# Patient Record
Sex: MALE | Race: WHITE | NOT HISPANIC OR LATINO | Employment: UNEMPLOYED | URBAN - METROPOLITAN AREA
[De-identification: names, ages, dates, MRNs, and addresses within clinical notes are randomized per-mention and may not be internally consistent; named-entity substitution may affect disease eponyms.]

---

## 2020-03-05 ENCOUNTER — HOSPITAL ENCOUNTER (OUTPATIENT)
Dept: OTHER | Facility: HOSPITAL | Age: 60
Discharge: HOME | End: 2020-03-05
Attending: FAMILY MEDICINE

## 2020-03-05 LAB
CREAT BLD-MCNC: 1.1 MG/DL (ref 0.6–1.4)
GFR SERPLBLD BASED ON 1.73 SQ M-ARVRAT: >60 ML/MIN/{1.73_M2}

## 2020-05-11 ENCOUNTER — TELEHEALTH PROVIDED OTHER THAN IN PATIENT'S HOME (OUTPATIENT)
Dept: URBAN - METROPOLITAN AREA TELEHEALTH 6 | Facility: TELEHEALTH | Age: 60
End: 2020-05-11
Payer: COMMERCIAL

## 2020-05-11 VITALS — WEIGHT: 140 LBS | HEIGHT: 65 IN

## 2020-05-11 DIAGNOSIS — K57.30 DIVERTICULOSIS OF LARGE INTESTINE WITHOUT PERFORATION OR ABS: ICD-10-CM

## 2020-05-11 DIAGNOSIS — Z12.11 ENCOUNTER FOR SCREENING FOR MALIGNANT NEOPLASM OF COLON: ICD-10-CM

## 2020-05-11 PROCEDURE — 99202 OFFICE O/P NEW SF 15 MIN: CPT | Mod: 95 | Performed by: INTERNAL MEDICINE

## 2020-10-21 VITALS
RESPIRATION RATE: 6 BRPM | HEART RATE: 78 BPM | SYSTOLIC BLOOD PRESSURE: 141 MMHG | DIASTOLIC BLOOD PRESSURE: 72 MMHG | SYSTOLIC BLOOD PRESSURE: 102 MMHG | HEART RATE: 85 BPM | HEART RATE: 64 BPM | OXYGEN SATURATION: 95 % | OXYGEN SATURATION: 99 % | SYSTOLIC BLOOD PRESSURE: 131 MMHG | HEIGHT: 65 IN | RESPIRATION RATE: 20 BRPM | SYSTOLIC BLOOD PRESSURE: 143 MMHG | RESPIRATION RATE: 12 BRPM | DIASTOLIC BLOOD PRESSURE: 80 MMHG | SYSTOLIC BLOOD PRESSURE: 111 MMHG | OXYGEN SATURATION: 92 % | SYSTOLIC BLOOD PRESSURE: 125 MMHG | DIASTOLIC BLOOD PRESSURE: 85 MMHG | DIASTOLIC BLOOD PRESSURE: 73 MMHG | HEART RATE: 77 BPM | TEMPERATURE: 97.9 F | DIASTOLIC BLOOD PRESSURE: 103 MMHG | HEART RATE: 75 BPM | RESPIRATION RATE: 13 BRPM | WEIGHT: 140 LBS | DIASTOLIC BLOOD PRESSURE: 82 MMHG | RESPIRATION RATE: 16 BRPM | OXYGEN SATURATION: 98 % | HEART RATE: 87 BPM | SYSTOLIC BLOOD PRESSURE: 109 MMHG | HEART RATE: 68 BPM | HEART RATE: 81 BPM | DIASTOLIC BLOOD PRESSURE: 89 MMHG | DIASTOLIC BLOOD PRESSURE: 74 MMHG | TEMPERATURE: 97 F | RESPIRATION RATE: 14 BRPM | DIASTOLIC BLOOD PRESSURE: 75 MMHG | SYSTOLIC BLOOD PRESSURE: 118 MMHG | SYSTOLIC BLOOD PRESSURE: 119 MMHG | SYSTOLIC BLOOD PRESSURE: 122 MMHG | HEART RATE: 71 BPM

## 2020-10-26 ENCOUNTER — OFFICE (OUTPATIENT)
Dept: URBAN - METROPOLITAN AREA PATHOLOGY 4 | Facility: PATHOLOGY | Age: 60
End: 2020-10-26
Payer: COMMERCIAL

## 2020-10-26 ENCOUNTER — AMBULATORY SURGICAL CENTER (OUTPATIENT)
Dept: URBAN - METROPOLITAN AREA SURGERY 17 | Facility: SURGERY | Age: 60
End: 2020-10-26
Payer: COMMERCIAL

## 2020-10-26 DIAGNOSIS — Z12.11 ENCOUNTER FOR SCREENING FOR MALIGNANT NEOPLASM OF COLON: ICD-10-CM

## 2020-10-26 DIAGNOSIS — K63.5 POLYP OF COLON: ICD-10-CM

## 2020-10-26 DIAGNOSIS — D12.8 BENIGN NEOPLASM OF RECTUM: ICD-10-CM

## 2020-10-26 DIAGNOSIS — D12.2 BENIGN NEOPLASM OF ASCENDING COLON: ICD-10-CM

## 2020-10-26 DIAGNOSIS — K57.30 DIVERTICULOSIS OF LARGE INTESTINE WITHOUT PERFORATION OR ABS: ICD-10-CM

## 2020-10-26 PROBLEM — D12.3 BENIGN NEOPLASM OF TRANSVERSE COLON: Status: ACTIVE | Noted: 2020-10-26

## 2020-10-26 PROBLEM — K62.1 RECTAL POLYP: Status: ACTIVE | Noted: 2020-10-26

## 2020-10-26 LAB
GI HISTOLOGY: A. UNSPECIFIED: (no result)
GI HISTOLOGY: B. UNSPECIFIED: (no result)
GI HISTOLOGY: C. UNSPECIFIED: (no result)
GI HISTOLOGY: PDF REPORT: (no result)

## 2020-10-26 PROCEDURE — 88305 TISSUE EXAM BY PATHOLOGIST: CPT | Mod: 33 | Performed by: INTERNAL MEDICINE

## 2020-10-26 PROCEDURE — 45380 COLONOSCOPY AND BIOPSY: CPT | Mod: 33,59 | Performed by: INTERNAL MEDICINE

## 2020-10-26 PROCEDURE — 45385 COLONOSCOPY W/LESION REMOVAL: CPT | Mod: 33 | Performed by: INTERNAL MEDICINE

## 2022-06-28 ENCOUNTER — TRANSCRIBE ORDERS (OUTPATIENT)
Dept: PULMONOLOGY | Facility: HOSPITAL | Age: 62
End: 2022-06-28

## 2022-06-28 DIAGNOSIS — R05.3 CHRONIC COUGH: Primary | ICD-10-CM

## 2022-08-04 ENCOUNTER — HOSPITAL ENCOUNTER (OUTPATIENT)
Dept: CT IMAGING | Facility: HOSPITAL | Age: 62
Discharge: HOME | End: 2022-08-04
Admitting: INTERNAL MEDICINE

## 2022-08-04 DIAGNOSIS — R05.3 CHRONIC COUGH: ICD-10-CM

## 2022-08-04 PROCEDURE — 71250 CT THORAX DX C-: CPT

## 2025-01-02 ENCOUNTER — OFFICE VISIT (OUTPATIENT)
Dept: CARDIOLOGY | Facility: CLINIC | Age: 65
End: 2025-01-02
Payer: COMMERCIAL

## 2025-01-02 VITALS
HEIGHT: 65 IN | HEART RATE: 83 BPM | WEIGHT: 144 LBS | OXYGEN SATURATION: 97 % | BODY MASS INDEX: 23.99 KG/M2 | SYSTOLIC BLOOD PRESSURE: 154 MMHG | DIASTOLIC BLOOD PRESSURE: 90 MMHG

## 2025-01-02 DIAGNOSIS — I10 PRIMARY HYPERTENSION: ICD-10-CM

## 2025-01-02 DIAGNOSIS — Z91.89 CHRONIC CHEST PAIN WITH HIGH RISK FOR CAD: Primary | ICD-10-CM

## 2025-01-02 DIAGNOSIS — G89.29 CHRONIC CHEST PAIN WITH HIGH RISK FOR CAD: Primary | ICD-10-CM

## 2025-01-02 DIAGNOSIS — R20.9 SENSATION OF COLD IN LEG: ICD-10-CM

## 2025-01-02 DIAGNOSIS — R07.9 CHRONIC CHEST PAIN WITH HIGH RISK FOR CAD: Primary | ICD-10-CM

## 2025-01-02 PROCEDURE — 93000 ELECTROCARDIOGRAM COMPLETE: CPT | Performed by: INTERNAL MEDICINE

## 2025-01-02 PROCEDURE — 99204 OFFICE O/P NEW MOD 45 MIN: CPT | Performed by: INTERNAL MEDICINE

## 2025-01-02 RX ORDER — TRIAMTERENE AND HYDROCHLOROTHIAZIDE 37.5; 25 MG/1; MG/1
1 CAPSULE ORAL DAILY
COMMUNITY
Start: 2024-12-31

## 2025-01-02 RX ORDER — METOPROLOL TARTRATE 50 MG
25 TABLET ORAL
COMMUNITY
End: 2025-01-02

## 2025-01-02 RX ORDER — NITROGLYCERIN 0.4 MG/1
TABLET SUBLINGUAL
COMMUNITY
Start: 2024-11-19

## 2025-01-02 RX ORDER — METOPROLOL SUCCINATE 25 MG/1
25 TABLET, EXTENDED RELEASE ORAL DAILY
Qty: 90 TABLET | Refills: 3 | Status: SHIPPED | OUTPATIENT
Start: 2025-01-02

## 2025-01-02 NOTE — PROGRESS NOTES
"      CARDIOLOGY    Mary Antony MD    ENCOUNTER DATE:  01/02/2025    Navi York / 64 y.o. / male        CHIEF COMPLAINT / REASON FOR OFFICE VISIT     Chest Pain      HISTORY OF PRESENT ILLNESS       HPI    Navi York is a 64 y.o. male     His blood pressure has been elevated. He had some chest pain at [facility]. His legs have felt cold. He has not had any cardiac workup.         VITAL SIGNS     Visit Vitals  /90   Pulse 83   Ht 165.1 cm (65\")   Wt 65.3 kg (144 lb)   SpO2 97%   BMI 23.96 kg/m²         Wt Readings from Last 3 Encounters:   01/02/25 65.3 kg (144 lb)     Body mass index is 23.96 kg/m².      PHYSICAL EXAMINATION     Constitutional:       General: Not in acute distress.  Neck:      Vascular: No carotid bruit or JVD.   Pulmonary:      Effort: Pulmonary effort is normal.      Breath sounds: Normal breath sounds.   Cardiovascular:      Normal rate. Regular rhythm.      Murmurs: There is no murmur.   Psychiatric:         Mood and Affect: Mood and affect normal.           REVIEWED DATA       ECG 12 Lead    Date/Time: 1/2/2025 10:06 AM  Performed by: Mary Antony MD    Authorized by: Mary Antony MD  Comparison: compared with previous ECG from 11/19/2024  Similar to previous ECG  Rhythm: sinus rhythm  BPM: 83  Conduction: conduction normal  Other findings: non-specific ST-T wave changes    Clinical impression: abnormal EKG                No results found for: \"GLUCOSE\", \"BUN\", \"CREATININE\", \"NA\", \"K\", \"CL\", \"CALCIUM\", \"PROTEINTOT\", \"ALBUMIN\", \"ALT\", \"AST\", \"ALKPHOS\", \"BILITOT\", \"GLOB\", \"AGRATIO\", \"BCR\", \"ANIONGAP\", \"EGFR\"    ASSESSMENT & PLAN      Diagnosis Plan   1. Chronic chest pain with high risk for CAD  Doppler Arterial Multi Level Lower Extremity - Bilateral CAR    Adult Stress Echo W/ Cont or Stress Agent if Necessary Per Protocol      2. Sensation of cold in leg  Doppler Arterial Multi Level Lower Extremity - Bilateral CAR    Adult Stress Echo W/ Cont or Stress Agent if Necessary " Per Protocol      3. Primary hypertension  Doppler Arterial Multi Level Lower Extremity - Bilateral CAR    Adult Stress Echo W/ Cont or Stress Agent if Necessary Per Protocol          Chest discomfort. I recommend that he have a stress echocardiogram. He is acceptable. Will do his stress echo here in the office and one of my nurses or I will call him once I have reviewed the results.   Cold sensation in his legs. Let's check an CHINO to make sure he is not having any sort of vascular disease.   Hypertension. His blood pressure is elevated today. He has a blood pressure cuff at home. I recommend that he continue with his medications and that he start checking his blood pressure on a routine basis. He is on metoprolol tartrate but he is only taking it once a day, so I switched it over to metoprolol succinate which is an appropriate once a day version of metoprolol, and if his blood pressure is not controlled we can start adjusting his medication. I am going to have him follow up with Didi in a couple of months to go over his blood pressure medications. In the meantime my office or I will be in touch with the results of his stress test and his CHINO.         Orders Placed This Encounter   Procedures    ECG 12 Lead     This order was created via procedure documentation     Order Specific Question:   Release to patient     Answer:   Routine Release [[id number]]    Adult Stress Echo W/ Cont or Stress Agent if Necessary Per Protocol     Standing Status:   Future     Standing Expiration Date:   1/2/2026     Order Specific Question:   What stress agent will be used?     Answer:   Exercise with Possible [employer]     Order Specific Question:   Reason for exam?     Answer:   Chest Pain     Order Specific Question:   Reason for exam?     Answer:   Dyspnea     Order Specific Question:   Release to patient     Answer:   Routine Release [[id number]]           MEDICATIONS         Discharge Medications            Accurate as of  January 2, 2025 10:07 AM. If you have any questions, ask your nurse or doctor.                New Medications        Instructions Start Date   metoprolol succinate XL 25 MG 24 hr tablet  Commonly known as: TOPROL-XL  Started by: Mary Antony   25 mg, Oral, Daily             Continue These Medications        Instructions Start Date   nitroglycerin 0.4 MG SL tablet  Commonly known as: NITROSTAT   DISSOLVE 1 TABLET UNDER THE TONGUE EVERY 5 MINUTES AS NEEDED FOR CHEST PAIN. DO NOT EXCEED A TOTAL OF 3 DOSES IN 15 MINUTES. CALL 911 IF NO RELIEF.      triamterene-hydrochlorothiazide 37.5-25 MG per capsule  Commonly known as: DYAZIDE   1 capsule, Daily             Stop These Medications      metoprolol tartrate 50 MG tablet  Commonly known as: LOPRESSOR  Stopped by: Mary Antony MD  01/02/25  10:07 EST    Part of this note may be an electronic transcription/translation of spoken language to printed text using the Dragon dictation system.

## 2025-01-03 ENCOUNTER — PATIENT ROUNDING (BHMG ONLY) (OUTPATIENT)
Dept: CARDIOLOGY | Facility: CLINIC | Age: 65
End: 2025-01-03
Payer: COMMERCIAL

## 2025-01-03 NOTE — PROGRESS NOTES
A My Chart message has been sent to the patient for [place] [facility]

## 2025-02-11 ENCOUNTER — TELEPHONE (OUTPATIENT)
Dept: CARDIOLOGY | Facility: CLINIC | Age: 65
End: 2025-02-11
Payer: COMMERCIAL

## 2025-02-12 ENCOUNTER — HOSPITAL ENCOUNTER (OUTPATIENT)
Dept: CARDIOLOGY | Facility: HOSPITAL | Age: 65
Discharge: HOME | End: 2025-02-12
Payer: COMMERCIAL

## 2025-02-12 ENCOUNTER — TELEPHONE (OUTPATIENT)
Dept: CARDIOLOGY | Facility: CLINIC | Age: 65
End: 2025-02-12
Payer: COMMERCIAL

## 2025-02-12 ENCOUNTER — TELEPHONE (OUTPATIENT)
Dept: CARDIOLOGY | Facility: CLINIC | Age: 65
End: 2025-02-12

## 2025-02-12 VITALS
WEIGHT: 144 LBS | BODY MASS INDEX: 23.99 KG/M2 | SYSTOLIC BLOOD PRESSURE: 122 MMHG | HEART RATE: 63 BPM | HEIGHT: 65 IN | DIASTOLIC BLOOD PRESSURE: 78 MMHG

## 2025-02-12 DIAGNOSIS — Z91.89 CHRONIC CHEST PAIN WITH HIGH RISK FOR CAD: ICD-10-CM

## 2025-02-12 DIAGNOSIS — R20.9 SENSATION OF COLD IN LEG: ICD-10-CM

## 2025-02-12 DIAGNOSIS — I10 PRIMARY HYPERTENSION: ICD-10-CM

## 2025-02-12 DIAGNOSIS — G89.29 CHRONIC CHEST PAIN WITH HIGH RISK FOR CAD: ICD-10-CM

## 2025-02-12 DIAGNOSIS — R07.9 CHRONIC CHEST PAIN WITH HIGH RISK FOR CAD: ICD-10-CM

## 2025-02-12 LAB
AORTIC ARCH: 2.8 CM
AORTIC DIMENSIONLESS INDEX: 0.96 (DI)
ASCENDING AORTA: 3.2 CM
AV MEAN PRESS GRAD SYS DOP V1V2: 4.3 MMHG
AV VMAX SYS DOP: 142.5 CM/SEC
BH CV ECHO MEAS - ACS: 1.92 CM
BH CV ECHO MEAS - AI P1/2T: 915.2 MSEC
BH CV ECHO MEAS - AO MAX PG: 8.1 MMHG
BH CV ECHO MEAS - AO ROOT DIAM: 3.1 CM
BH CV ECHO MEAS - AO V2 VTI: 33.7 CM
BH CV ECHO MEAS - AVA(I,D): 3.1 CM2
BH CV ECHO MEAS - EDV(CUBED): 79.5 ML
BH CV ECHO MEAS - EDV(MOD-SP2): 102 ML
BH CV ECHO MEAS - EDV(MOD-SP4): 121 ML
BH CV ECHO MEAS - EF(MOD-SP2): 69.6 %
BH CV ECHO MEAS - EF(MOD-SP4): 67.8 %
BH CV ECHO MEAS - ESV(CUBED): 16.1 ML
BH CV ECHO MEAS - ESV(MOD-SP2): 31 ML
BH CV ECHO MEAS - ESV(MOD-SP4): 39 ML
BH CV ECHO MEAS - FS: 41.3 %
BH CV ECHO MEAS - IVS/LVPW: 1.44 CM
BH CV ECHO MEAS - IVSD: 1.3 CM
BH CV ECHO MEAS - LAT PEAK E' VEL: 11.6 CM/SEC
BH CV ECHO MEAS - LV DIASTOLIC VOL/BSA (35-75): 71.2 CM2
BH CV ECHO MEAS - LV MASS(C)D: 162.9 GRAMS
BH CV ECHO MEAS - LV MAX PG: 7 MMHG
BH CV ECHO MEAS - LV MEAN PG: 3.9 MMHG
BH CV ECHO MEAS - LV SYSTOLIC VOL/BSA (12-30): 22.9 CM2
BH CV ECHO MEAS - LV V1 MAX: 132 CM/SEC
BH CV ECHO MEAS - LV V1 VTI: 32.7 CM
BH CV ECHO MEAS - LVIDD: 4.3 CM
BH CV ECHO MEAS - LVIDS: 2.5 CM
BH CV ECHO MEAS - LVOT AREA: 3.2 CM2
BH CV ECHO MEAS - LVOT DIAM: 2.02 CM
BH CV ECHO MEAS - LVPWD: 0.9 CM
BH CV ECHO MEAS - MED PEAK E' VEL: 8.6 CM/SEC
BH CV ECHO MEAS - MV A DUR: 0.19 SEC
BH CV ECHO MEAS - MV A MAX VEL: 56.6 CM/SEC
BH CV ECHO MEAS - MV DEC SLOPE: 296.2 CM/SEC2
BH CV ECHO MEAS - MV DEC TIME: 0.2 SEC
BH CV ECHO MEAS - MV E MAX VEL: 65.1 CM/SEC
BH CV ECHO MEAS - MV E/A: 1.15
BH CV ECHO MEAS - MV MAX PG: 2.7 MMHG
BH CV ECHO MEAS - MV MEAN PG: 0.88 MMHG
BH CV ECHO MEAS - MV P1/2T: 83.7 MSEC
BH CV ECHO MEAS - MV V2 VTI: 30.3 CM
BH CV ECHO MEAS - MVA(P1/2T): 2.6 CM2
BH CV ECHO MEAS - MVA(VTI): 3.5 CM2
BH CV ECHO MEAS - PA ACC TIME: 0.14 SEC
BH CV ECHO MEAS - PA V2 MAX: 116.7 CM/SEC
BH CV ECHO MEAS - PI END-D VEL: 135.7 CM/SEC
BH CV ECHO MEAS - PULM A REVS DUR: 0.18 SEC
BH CV ECHO MEAS - PULM A REVS VEL: 31.3 CM/SEC
BH CV ECHO MEAS - PULM DIAS VEL: 40.7 CM/SEC
BH CV ECHO MEAS - PULM S/D: 1
BH CV ECHO MEAS - PULM SYS VEL: 40.7 CM/SEC
BH CV ECHO MEAS - QP/QS: 0.71
BH CV ECHO MEAS - RV MAX PG: 1.44 MMHG
BH CV ECHO MEAS - RV V1 MAX: 60 CM/SEC
BH CV ECHO MEAS - RV V1 VTI: 16 CM
BH CV ECHO MEAS - RVOT DIAM: 2.44 CM
BH CV ECHO MEAS - SV(LVOT): 104.8 ML
BH CV ECHO MEAS - SV(MOD-SP2): 71 ML
BH CV ECHO MEAS - SV(MOD-SP4): 82 ML
BH CV ECHO MEAS - SV(RVOT): 74.6 ML
BH CV ECHO MEAS - SVI(LVOT): 61.7 ML/M2
BH CV ECHO MEAS - SVI(MOD-SP2): 41.8 ML/M2
BH CV ECHO MEAS - SVI(MOD-SP4): 48.2 ML/M2
BH CV ECHO MEAS - TAPSE (>1.6): 2.43 CM
BH CV ECHO MEAS - TR MAX PG: 28 MMHG
BH CV ECHO MEAS - TR MAX VEL: 264.7 CM/SEC
BH CV ECHO MEASUREMENTS AVERAGE E/E' RATIO: 6.45
BH CV LOWER ARTERIAL LEFT ABI RATIO: 1.21
BH CV LOWER ARTERIAL LEFT DORSALIS PEDIS SYS MAX: 167
BH CV LOWER ARTERIAL LEFT GREAT TOE SYS MAX: 154
BH CV LOWER ARTERIAL LEFT POST TIBIAL SYS MAX: 151
BH CV LOWER ARTERIAL LEFT TBI RATIO: 1.12
BH CV LOWER ARTERIAL RIGHT ABI RATIO: 1.12
BH CV LOWER ARTERIAL RIGHT DORSALIS PEDIS SYS MAX: 150
BH CV LOWER ARTERIAL RIGHT GREAT TOE SYS MAX: 138
BH CV LOWER ARTERIAL RIGHT POST TIBIAL SYS MAX: 155
BH CV LOWER ARTERIAL RIGHT TBI RATIO: 1
BH CV STRESS BP STAGE 1: NORMAL
BH CV STRESS BP STAGE 2: NORMAL
BH CV STRESS BP STAGE 3: NORMAL
BH CV STRESS DURATION MIN STAGE 1: 3
BH CV STRESS DURATION MIN STAGE 2: 3
BH CV STRESS DURATION MIN STAGE 3: 3
BH CV STRESS DURATION MIN STAGE 4: 2
BH CV STRESS DURATION SEC STAGE 1: 0
BH CV STRESS DURATION SEC STAGE 2: 0
BH CV STRESS DURATION SEC STAGE 3: 0
BH CV STRESS DURATION SEC STAGE 4: 20
BH CV STRESS ECHO POST STRESS EJECTION FRACTION EF: 84 %
BH CV STRESS GRADE STAGE 1: 10
BH CV STRESS GRADE STAGE 2: 12
BH CV STRESS GRADE STAGE 3: 14
BH CV STRESS GRADE STAGE 4: 16
BH CV STRESS HR STAGE 1: 77
BH CV STRESS HR STAGE 2: 90
BH CV STRESS HR STAGE 3: 114
BH CV STRESS HR STAGE 4: 138
BH CV STRESS METS STAGE 1: 5
BH CV STRESS METS STAGE 2: 7.5
BH CV STRESS METS STAGE 3: 10
BH CV STRESS METS STAGE 4: 13
BH CV STRESS PROTOCOL 1: NORMAL
BH CV STRESS SPEED STAGE 1: 1.7
BH CV STRESS SPEED STAGE 2: 2.5
BH CV STRESS SPEED STAGE 3: 3.4
BH CV STRESS SPEED STAGE 4: 4.2
BH CV STRESS STAGE 1: 1
BH CV STRESS STAGE 2: 2
BH CV STRESS STAGE 3: 3
BH CV STRESS STAGE 4: 4
BH CV XLRA - RV BASE: 3.4 CM
BH CV XLRA - RV LENGTH: 7.8 CM
BH CV XLRA - RV MID: 3 CM
BH CV XLRA - TDI S': 13.7 CM/SEC
LEFT ATRIUM VOLUME INDEX: 29.4 ML/M2
LV EF BIPLANE MOD: 69.5 %
MAXIMAL PREDICTED HEART RATE: 157 BPM
PERCENT MAX PREDICTED HR: 87.9 %
SINUS: 3.3 CM
STJ: 2.8 CM
STRESS BASELINE BP: NORMAL MMHG
STRESS BASELINE HR: 57 BPM
STRESS PERCENT HR: 103 %
STRESS POST ESTIMATED WORKLOAD: 13 METS
STRESS POST EXERCISE DUR MIN: 11 MIN
STRESS POST EXERCISE DUR SEC: 20 SEC
STRESS POST PEAK BP: NORMAL MMHG
STRESS POST PEAK HR: 138 BPM
STRESS TARGET HR: 133 BPM
UPPER ARTERIAL LEFT ARM BRACHIAL SYS MAX: 133
UPPER ARTERIAL RIGHT ARM BRACHIAL SYS MAX: 138

## 2025-02-12 PROCEDURE — 25510000001 PERFLUTREN 6.52 MG/ML SUSPENSION 2 ML VIAL: Performed by: INTERNAL MEDICINE

## 2025-02-12 PROCEDURE — 93017 CV STRESS TEST TRACING ONLY: CPT

## 2025-02-12 PROCEDURE — 93923 UPR/LXTR ART STDY 3+ LVLS: CPT

## 2025-02-12 PROCEDURE — 93922 UPR/L XTREMITY ART 2 LEVELS: CPT

## 2025-02-12 PROCEDURE — 93350 STRESS TTE ONLY: CPT

## 2025-02-12 PROCEDURE — 93320 DOPPLER ECHO COMPLETE: CPT

## 2025-02-12 PROCEDURE — 93325 DOPPLER ECHO COLOR FLOW MAPG: CPT

## 2025-02-12 RX ADMIN — PERFLUTREN 4 ML: 6.52 INJECTION, SUSPENSION INTRAVENOUS at 09:40

## 2025-02-12 NOTE — TELEPHONE ENCOUNTER
Results and recommendations called to pt.  Instructed to call with any further questions or concerns.  Verbalized understanding.    Kenya Schulz RN  Triage Nurse, [facility] [place]  02/12/25 14:19 EST

## 2025-02-12 NOTE — TELEPHONE ENCOUNTER
Please let him know that his stress echo was normal.  He did great on the treadmill.  Very low risk for heart attack in the next year.  CHINO normal indicating normal blood flow to the legs.  Keep follow-up with Didi in April.    Future Appointments         Provider Department Center    4/2/2025 9:00 AM Didi Marte APRN [facility] CARDIOLOGY [place]

## 2025-02-13 NOTE — TELEPHONE ENCOUNTER
Have him increase the metoprolol to 50 mg a day (AKA 2 pills once a day)  and continue to monitor blood pressure and heart rate and send in some new numbers in a week.

## 2025-02-13 NOTE — TELEPHONE ENCOUNTER
Called Pt to relay message from Provider. Pt verbalized understanding. He will send numbers in via redealize in one week.

## 2025-04-02 ENCOUNTER — OFFICE VISIT (OUTPATIENT)
Dept: CARDIOLOGY | Age: 65
End: 2025-04-02
Payer: COMMERCIAL

## 2025-04-02 VITALS
HEIGHT: 65 IN | HEART RATE: 63 BPM | OXYGEN SATURATION: 98 % | SYSTOLIC BLOOD PRESSURE: 118 MMHG | DIASTOLIC BLOOD PRESSURE: 74 MMHG | BODY MASS INDEX: 23.93 KG/M2 | WEIGHT: 143.6 LBS

## 2025-04-02 DIAGNOSIS — I10 PRIMARY HYPERTENSION: Primary | ICD-10-CM

## 2025-04-02 PROCEDURE — 99214 OFFICE O/P EST MOD 30 MIN: CPT | Performed by: NURSE PRACTITIONER

## 2025-04-02 RX ORDER — CHLORAL HYDRATE 500 MG
1000 CAPSULE ORAL
Qty: 90 CAPSULE | Refills: 1 | Status: SHIPPED | OUTPATIENT
Start: 2025-04-02

## 2025-04-02 RX ORDER — TRIAMTERENE AND HYDROCHLOROTHIAZIDE 37.5; 25 MG/1; MG/1
1 CAPSULE ORAL DAILY
Qty: 90 CAPSULE | Refills: 3 | Status: SHIPPED | OUTPATIENT
Start: 2025-04-02

## 2025-04-02 RX ORDER — METOPROLOL SUCCINATE 50 MG/1
50 TABLET, EXTENDED RELEASE ORAL DAILY
Qty: 90 TABLET | Refills: 3 | Status: SHIPPED | OUTPATIENT
Start: 2025-04-02

## 2025-04-02 NOTE — PROGRESS NOTES
Date of Office Visit: 25  Encounter Provider: LAURA Escobar  Place of Service: [facility] CARDIOLOGY  Patient Name: Navi York  :1960    Chief Complaint   Patient presents with    Chest Pain    Hypertension     3 month follow up    :     HPI: Navi York is a 65 y.o. male  with hypertension, LVH, coronary calcification on CT, fatty liver, osteoarthritis and lung scarring.  Hyperlipidemia with hypertriglyceridemia.        He is followed by Dr. Mary Antony.  I will visit with him for the first time and have reviewed his medical record    He complained of chest pain and cold sensation in his legs.  He had normal CHINO bilateral 2025.  He had no evidence of ischemia on stress echo with preserved LV systolic function, EF 66-70%, mild to moderate septal asymmetric hypertrophy, mild aortic valve regurgitation, mild tricuspid valve regurgitation, normal RVSP and rare PVCs on EKG.  He submitted a blood pressure log which showed some intermittent elevated values so metoprolol was increased from 25 mg daily to 50 mg daily.    He presents today for reassessment.  He is only been taking metoprolol succinate 25 mg daily but states while he was taking 50 mg daily his blood pressure was lower he did not have dizziness or lightheadedness.  He thought he was told to just take higher dose metoprolol for 1 week.  He has no chest pain or shortness of breath or edema or dizziness or palpitation.  He walks a lot at work but otherwise no structured exercise.  He reportedly had muscle pain with Crestor in the past.  He has never tried fish oil.  No near-syncope or syncope.  Allergies   Allergen Reactions    Crestor [Rosuvastatin] Myalgia     Unsure which dose            Family and social history reviewed.     ROS  All other systems were reviewed and are negative          Objective:     Vitals:    25 0856   BP: 118/74   Pulse: 63   SpO2: 98%   Weight: 65.1 kg (143 lb 9.6 oz)  "  Height: 165.1 cm (65\")     Body mass index is 23.9 kg/m².    PHYSICAL EXAM:  Physical Exam    Procedures      Current Outpatient Medications   Medication Sig Dispense Refill    metoprolol succinate XL (TOPROL-XL) 50 MG 24 hr tablet Take 1 tablet by mouth Daily. 90 tablet 3    nitroglycerin (NITROSTAT) 0.4 MG SL tablet DISSOLVE 1 TABLET UNDER THE TONGUE EVERY 5 MINUTES AS NEEDED FOR CHEST PAIN. DO NOT EXCEED A TOTAL OF 3 DOSES IN 15 MINUTES. CALL 911 IF NO RELIEF.      triamterene-hydrochlorothiazide (DYAZIDE) 37.5-25 MG per capsule Take 1 capsule by mouth Daily. 90 capsule 3    Omega-3 Fatty Acids (fish oil) 1000 MG capsule capsule Take 1 capsule by mouth Daily With Breakfast. 90 capsule 1     No current facility-administered medications for this visit.     Assessment:       Diagnosis Plan   1. Primary hypertension             No orders of the defined types were placed in this encounter.        Plan:       1.  65-year-old gentleman with mild to moderate septal asymmetric hypertrophy on echo February 2025-asymptomatic at this time  2.  Hypertension-blood pressure log from home was reviewed which continues to show intermittent elevated values greater than 140 systolic.  He will increase metoprolol from 25 mg daily to 50 mg daily and I have sent in a new prescription.  He will call with any issues otherwise follow-up in 6 months.  3.  Chronic calcification on CT chest without contrast August 2022.  Nonischemic stress echo February 2025.  He previously did not tolerate rosuvastatin due to myalgias.  He is agreeable to try fish oil 1000 mg daily  4.  Hyperlipidemia with hypertriglyceridemia.  Prior triglycerides are greater than 400 on scanned labs May 2024-previously tried rosuvastatin but had myalgias.  The dose of rosuvastatin is unknown to me.  He is agreeable to try fish oil 100 mg daily and I sent in a prescription.  5.Fatty liver  6. Right lower lobe scarring on CT chest August 2022  7.History of thorax " muscle/tendon injury  8.Osteoarthritis    Follow-up in 6 months.          It has been a pleasure to participate in this patient's care.      Thank you,  LAURA Escobar      **I used Dragon to dictate this note:**

## 2025-08-14 ENCOUNTER — TRANSCRIBE ORDERS (OUTPATIENT)
Dept: ADMINISTRATIVE | Facility: HOSPITAL | Age: 65
End: 2025-08-14
Payer: COMMERCIAL

## 2025-08-14 ENCOUNTER — HOSPITAL ENCOUNTER (OUTPATIENT)
Dept: GENERAL RADIOLOGY | Facility: HOSPITAL | Age: 65
Discharge: HOME | End: 2025-08-14
Admitting: NURSE PRACTITIONER
Payer: COMMERCIAL

## 2025-08-14 DIAGNOSIS — M54.50 LOW BACK PAIN, UNSPECIFIED BACK PAIN LATERALITY, UNSPECIFIED CHRONICITY, UNSPECIFIED WHETHER SCIATICA PRESENT: ICD-10-CM

## 2025-08-14 DIAGNOSIS — M54.50 LOW BACK PAIN, UNSPECIFIED BACK PAIN LATERALITY, UNSPECIFIED CHRONICITY, UNSPECIFIED WHETHER SCIATICA PRESENT: Primary | ICD-10-CM

## 2025-08-14 PROCEDURE — 72100 X-RAY EXAM L-S SPINE 2/3 VWS: CPT
